# Patient Record
Sex: MALE | Race: WHITE
[De-identification: names, ages, dates, MRNs, and addresses within clinical notes are randomized per-mention and may not be internally consistent; named-entity substitution may affect disease eponyms.]

---

## 2018-05-09 ENCOUNTER — HOSPITAL ENCOUNTER (EMERGENCY)
Dept: HOSPITAL 92 - ERS | Age: 28
Discharge: HOME | End: 2018-05-09
Payer: COMMERCIAL

## 2018-05-09 DIAGNOSIS — F17.210: ICD-10-CM

## 2018-05-09 DIAGNOSIS — L05.01: Primary | ICD-10-CM

## 2018-05-09 DIAGNOSIS — L03.317: ICD-10-CM

## 2018-05-09 PROCEDURE — 10080 I&D PILONIDAL CYST SIMPLE: CPT

## 2019-06-21 ENCOUNTER — HOSPITAL ENCOUNTER (OUTPATIENT)
Dept: HOSPITAL 92 - SDC | Age: 29
Discharge: HOME | End: 2019-06-21
Attending: ORTHOPAEDIC SURGERY
Payer: COMMERCIAL

## 2019-06-21 VITALS — BODY MASS INDEX: 24.3 KG/M2
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
## 2019-06-21 DIAGNOSIS — S82.52XA: Primary | ICD-10-CM

## 2019-06-21 DIAGNOSIS — S92.141A: ICD-10-CM

## 2019-06-21 DIAGNOSIS — Z79.1: ICD-10-CM

## 2019-06-21 DIAGNOSIS — Z79.891: ICD-10-CM
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
## 2019-06-21 DIAGNOSIS — G89.18: ICD-10-CM

## 2019-06-21 DIAGNOSIS — F17.200: ICD-10-CM

## 2019-06-21 PROCEDURE — 76000 FLUOROSCOPY <1 HR PHYS/QHP: CPT

## 2019-06-21 PROCEDURE — 3E0T33Z INTRODUCTION OF ANTI-INFLAMMATORY INTO PERIPHERAL NERVES AND PLEXI, PERCUTANEOUS APPROACH: ICD-10-PCS | Performed by: ORTHOPAEDIC SURGERY

## 2019-06-21 PROCEDURE — C1713 ANCHOR/SCREW BN/BN,TIS/BN: HCPCS

## 2019-06-21 PROCEDURE — 3E0T3BZ INTRODUCTION OF ANESTHETIC AGENT INTO PERIPHERAL NERVES AND PLEXI, PERCUTANEOUS APPROACH: ICD-10-PCS | Performed by: ORTHOPAEDIC SURGERY

## 2019-06-21 PROCEDURE — 0QSH04Z REPOSITION LEFT TIBIA WITH INTERNAL FIXATION DEVICE, OPEN APPROACH: ICD-10-PCS | Performed by: ORTHOPAEDIC SURGERY

## 2019-06-21 NOTE — RAD
LEFT ANKLE THREE VIEWS:

 

HISTORY:

Left ankle fracture.

 

COMPARISON:

06/17/2019

 

TECHNIQUE:

Three limited intraoperative fluoroscopic views of the left ankle were submitted for interpretation.

 

FINDINGS/IMPRESSION:

Two screws are seen in the medial malleolus.  No perihardware lucency is seen.

 

POS: [facility]

## 2019-06-21 NOTE — OP
DATE OF PROCEDURE:  06/21/2019



PROCEDURE PERFORMED:  Open reduction and internal fixation of left medial malleolus

ankle fracture. 



PREOPERATIVE DIAGNOSIS:  Displaced left medial malleolar ankle fracture.



POSTOPERATIVE DIAGNOSIS:  Displaced left medial malleolar ankle fracture.



COMPLICATIONS:  None.



ESTIMATED BLOOD LOSS:  Minimal.



ANESTHESIA:  General plus local.



IMPLANTS:  Two Synthes 4.0 partially-threaded cancellous screws were utilized.



INDICATIONS:  Mr. Leon is a 29-year-old male, who was injured at work.  He

fractured his left medial malleolus of the ankle.  He had a minimally displaced

right talar dome fracture.  He has been indicated for fixation of his left ankle to

restore anatomic alignment and promote healing.  Risks have been reviewed in detail.

 He elected to proceed with the operation. 



DESCRIPTION OF PROCEDURE:  Mr. Leon was identified in the preoperative holding

area.  His correct extremity was marked.  He was carried to the operating room.  He

was positioned supine.  General anesthesia was induced.  A multidisciplinary

time-out was performed.  The left lower extremity was prepped and draped in sterile

fashion. 



We began the procedure with a medial ankle incision.  We dissected down through the

subcutaneous tissues to the fascia, which was opened.  We explored the fracture.  We

irrigated the joint and removed hematoma.  We then reduced the medial malleolar

fracture back into its anatomic position with a reduction clamp.  At this point, we

placed a K-wire across the fracture, stabilizing it.  We then placed a 4.0 mm

cancellous screw, partially threaded, across the fracture, an additional screw was

placed.  We took 

images, confirming reduction and alignment.  There was no hardware complication.  We

then stressed the ankle.  There was no widening.  At this point, we thoroughly

irrigated with copious lavage.  We then closed appropriately in layers and placed

the patient in his boot.  He was taken to the recovery room in good condition. 







Job ID:  [id number]

## 2020-05-21 NOTE — OP
DATE OF PROCEDURE:  05/21/2020



OPERATION:  Left ankle hardware removal.



PREOPERATIVE DIAGNOSIS:  History of left ankle fracture with painful hardware.



POSTOPERATIVE DIAGNOSIS:  History of left ankle fracture with painful hardware.



COMPLICATIONS:  None.



ESTIMATED BLOOD LOSS:  Minimal.



ASSISTANT:  First assistant:  None.



IMPLANTS:  None.



INDICATIONS:  Mr. Leon is a 30-year-old male who has injured his ankle.  He

had an ankle fracture, treated operatively with open reduction and internal

fixation.  He has been indicated now for hardware removal from the ankle.  Risks

have been reviewed in detail.  He has elected to proceed with the operation. 



DESCRIPTION OF PROCEDURE:  Mr. Leon was identified in the preoperative holding

area.  His correct extremity was marked.  He was carried to the operating room.  He

was positioned supine.  General anesthesia was induced.  A multidisciplinary

time-out was performed.  The left lower extremity was prepped and draped in sterile

fashion. 



We began the procedure with a medial incision.  We dissected down through the

subcutaneous tissues to the fascia.  We incised the tissue over the medial

malleolus.  We identified two screws in the medial malleolus.  These were backed out

with an appropriate screwdriver.  We took x-ray images confirming hardware was

removed.  The fracture was well healed.  We irrigated and closed appropriately in

layers.  A sterile dressing was applied.  The patient was taken to the recovery room

in good condition. 







Job ID:  [id number]

## 2020-05-21 NOTE — RAD
LEFT ANKLE TWO VIEWS:

5/21/20

 

HISTORY: 

Hardware removal.

 

FINDINGS/IMPRESSION: 

Two spot fluoroscopic images of the left ankle demonstrate interval removal of the two screws noted o
n the exam of 6/21/19 in the medial malleolus. 

 

POS: [facility]